# Patient Record
Sex: FEMALE | Race: WHITE | NOT HISPANIC OR LATINO | Employment: OTHER | URBAN - METROPOLITAN AREA
[De-identification: names, ages, dates, MRNs, and addresses within clinical notes are randomized per-mention and may not be internally consistent; named-entity substitution may affect disease eponyms.]

---

## 2023-11-24 ENCOUNTER — HOSPITAL ENCOUNTER (EMERGENCY)
Facility: HOSPITAL | Age: 43
Discharge: HOME | End: 2023-11-24
Attending: EMERGENCY MEDICINE
Payer: COMMERCIAL

## 2023-11-24 VITALS
WEIGHT: 144.2 LBS | RESPIRATION RATE: 18 BRPM | OXYGEN SATURATION: 98 % | TEMPERATURE: 98.2 F | SYSTOLIC BLOOD PRESSURE: 124 MMHG | DIASTOLIC BLOOD PRESSURE: 75 MMHG | HEART RATE: 80 BPM

## 2023-11-24 DIAGNOSIS — H04.123 BILATERAL DRY EYES: Primary | ICD-10-CM

## 2023-11-24 PROCEDURE — 99284 EMERGENCY DEPT VISIT MOD MDM: CPT | Performed by: EMERGENCY MEDICINE

## 2023-11-24 PROCEDURE — 99282 EMERGENCY DEPT VISIT SF MDM: CPT

## 2023-11-24 RX ORDER — ERYTHROMYCIN 5 MG/G
0.5 OINTMENT OPHTHALMIC ONCE
Status: COMPLETED | OUTPATIENT
Start: 2023-11-24 | End: 2023-11-24

## 2023-11-24 RX ORDER — MINERAL OIL, PETROLATUM 425; 573 MG/G; MG/G
OINTMENT OPHTHALMIC AS NEEDED
Qty: 3.5 G | Refills: 0 | Status: SHIPPED | OUTPATIENT
Start: 2023-11-24 | End: 2023-11-24 | Stop reason: SDUPTHER

## 2023-11-24 RX ORDER — ERYTHROMYCIN 5 MG/G
OINTMENT OPHTHALMIC
Qty: 50 G | Refills: 0 | Status: SHIPPED | OUTPATIENT
Start: 2023-11-24

## 2023-11-24 RX ORDER — TETRACAINE HYDROCHLORIDE 5 MG/ML
2 SOLUTION OPHTHALMIC ONCE
Status: COMPLETED | OUTPATIENT
Start: 2023-11-24 | End: 2023-11-24

## 2023-11-24 RX ORDER — KETOROLAC TROMETHAMINE 5 MG/ML
1 SOLUTION OPHTHALMIC 4 TIMES DAILY
Status: DISCONTINUED | OUTPATIENT
Start: 2023-11-24 | End: 2023-11-24 | Stop reason: HOSPADM

## 2023-11-24 RX ORDER — MINERAL OIL, PETROLATUM 425; 573 MG/G; MG/G
OINTMENT OPHTHALMIC AS NEEDED
Qty: 3.5 G | Refills: 0 | Status: SHIPPED | OUTPATIENT
Start: 2023-11-24

## 2023-11-24 RX ADMIN — ERYTHROMYCIN 0.5 INCH: 5 OINTMENT OPHTHALMIC at 06:40

## 2023-11-24 RX ADMIN — FLUORESCEIN SODIUM 1 STRIP: 1 STRIP OPHTHALMIC at 04:25

## 2023-11-24 RX ADMIN — KETOROLAC TROMETHAMINE 1 DROP: 5 SOLUTION OPHTHALMIC at 04:25

## 2023-11-24 RX ADMIN — TETRACAINE HYDROCHLORIDE 2 DROP: 5 SOLUTION OPHTHALMIC at 04:25

## 2023-11-24 NOTE — DISCHARGE INSTRUCTIONS
Ketorolac twice a day  Erythromycin every 4-6 hours while awake    Call the office of Dr. Tanner de leon at [facility] aid if you want them

## 2023-11-26 NOTE — ED PROVIDER NOTES
Final Diagnosis:  1. Bilateral dry eyes        Chief Complaint   Patient presents with    Eye Pain     Pt has bilateral eye pain and dryness. "I feel like there's an eyelash stuck in both eyes". Pt rep[orts pain and blurriness began at 8am yesterday morning after putting in contacts. Scleras red       HPI  Patient presents w/ dry eyes and some itchiness. She's been scratching her eyes a lot because of driving w/ contacts in. Has glasses she's using now and acn use throughout treatment. No pupillary abnoramlity. Consensual photophobia. No photophobia. No trauma. Has mild red conjunctivitis. On fluorescein no ulcer. Fields intact. Pressures normal. 14 and 12. Relief w/ tetracaine. EMS reports if applicable: N/A    - Previous charting underwent limited review with attention to last ED visits, labs, ekgs, and prior imaging. Chart review reveals :     No results found for any previous visit. - No language barrier.   - History obtained from patient . - There are no limitations to the history obtained. - Discuss patient's care, with patient permission or by chart review, with      PMH:   has a past medical history of Dry eyes. PSH:   has no past surgical history on file. Social History:  Tobacco Use: Not on file     Alcohol Use: Not on file     No illicit use       ROS:  Pertinent positives/negatives: Cristian Yee Some ROS may be present in the HPI and would take precedent over these standard questions asked below. Review of Systems   Eyes:  Positive for redness and itching. CONSTITUTIONAL:  No lethargy. No weakness. No unexpected weight loss. No appetite change. EYES:  No pain, redness, or discharge. No loss of vision. No orbital trauma or pain. ENT:  No tinnitus or decreased hearing. No epistaxis/purulent rhinorrhea. No voice change, airway closing, trismus. CARDIOVASCULAR:  No chest pain. No skin mottling or pallor. No change in exertional capacity  RESPIRATORY:  No hemoptysis.  No paroxysmal nocturnal dyspnea. No stridor. No audible wheezing. No production with cough. GASTROINTESTINAL:  Normal appetite. No vomiting, diarrhea. No pain. No bloating. No melena. No hematochezia. GENITOURINARY:  No frequency, urgency, nocturia. No hematuria or dysuria. No discharge. No sores/adenopathy. MUSCULOSKELETAL:  No arthralgias or myalgias that are new. No new deformity. INTEGUMENTARY:  No swelling. No unexpected contusions. No abrasions. No lymphangitis. NEUROLOGIC:  No meningismus. No new numbness of the extremities. No new focal weakness. No postural instability  PSYCHIATRIC:  No SI HI AVH  HEMATOLOGICAL:  No bleeding. No petechiae. No bruising. ALLERGIES:  No urticaria. No sudden abd cramping. No stridor. PE:     Physical exam highlights:   Physical Exam       Vitals:    11/24/23 0412   BP: 124/75   Pulse: 80   Resp: 18   Temp: 98.2 °F (36.8 °C)   TempSrc: Oral   SpO2: 98%   Weight: 65.4 kg (144 lb 3.2 oz)     Vitals reviewed by me. Nursing note reviewed  Chaperone present for all sensitive exam.  Const: No acute distress. Alert. Nontoxic. Not diaphoretic. HEENT: External ears normal. No protrusion drainage swelling. Nose normal. No drainage/traumatic deformity. MMM. Mouth with baseline/symmetric movement. No trismus. Eyes: No icterus. No tearing/swelling/drainage. Tracks through the room with normal EOM. Conjunctivitis. No ulcer on fluoresecin. Pressures 14 and 12. 20/70 b/l. Cannot appreciate flare. Pupils not fixed dilated or anything. Neck: ROM normal. No rigidity. No meningismus. Cards: Rate as per vitals Compared to monitor sinus unless documented. Regular Well perfused. Pulm: able to verbalize without additional effort. Effort and excursion normal. No distress. No audible wheezing/ stridor. Normal resp rate without retraction or change in work of breathing. Abd: No distension beyond baseline. No fluctuant wave. Patient without peritoneal pain with shifting/bumping the bed.    MSK: ROM normal baseline. No deformity. No contractures from baseline. Skin: No new rashes visible. Well perfused. No wounds visualized on exposed skin  Neuro: Nonfocal. Baseline. CN grossly intact. Moving all four with coordination. Psych: Normal behavior and affect. A:  - Nursing note reviewed. Ddx and MDM  Considered diagnoses  Sounds like conjunctivitis  Prefers erythromycin because ointment and dry eyes     Will leave off pseudomonas coverage, no contacts for a week    R/o ulcer  Abrasion? Superficial based on tetracaine test    Ketorolac, erythromycin, ophtho f/u          My conversation with consultant reveals: NA       Decision rules:                           My read of the XR/CT scan reveals:  NA   No orders to display       No orders of the defined types were placed in this encounter. Labs Reviewed - No data to display    *Each of these labs was reviewed. Particular standout labs will be noted in the ED Course above     Final Diagnosis:  1. Bilateral dry eyes          P:  - hospital tx includes   Medications   tetracaine 0.5 % ophthalmic solution 2 drop (2 drops Both Eyes Given 11/24/23 0425)   fluorescein sodium sterile ophthalmic strip 1 strip (1 strip Both Eyes Given 11/24/23 0425)   erythromycin (ILOTYCIN) 0.5 % ophthalmic ointment 0.5 inch (0.5 inches Both Eyes Given 11/24/23 0640)         - disposition  Time reflects when diagnosis was documented in both MDM as applicable and the Disposition within this note       Time User Action Codes Description Comment    11/24/2023  6:24 AM Heleneanitha Cool Add [O03.877] Bilateral dry eyes           ED Disposition       ED Disposition   Discharge    Condition   Stable    Date/Time   Fri Nov 24, 2023  6:24 AM    Comment   Milana Leo discharge to home/self care.                    Follow-up Information       Follow up With Specialties Details Why [address] Sunny Arceo MD Ophthalmology   [address] [facility] [address]  [phone]              - patient will call their PCP to let them know they were in the emergency department. We discuss return precautions and patient is agreeable with plan and aformentioned disposition. - additional treatment intended, if consistent with primary provider:  - patient to follow with :      Discharge Medication List as of 11/24/2023  6:26 AM        START taking these medications    Details   erythromycin (ILOTYCIN) ophthalmic ointment Place a 1/2 inch ribbon of ointment into the lower eyelid. , Normal           CONTINUE these medications which have CHANGED    Details   White Petrolatum-Mineral Oil (artificial tears) Administer to both eyes as needed for dry eyes, Starting Fri 11/24/2023, Normal           No discharge procedures on file. None       Portions of the record may have been created with voice recognition software. Occasional wrong word or "sound a like" substitutions may have occurred due to the inherent limitations of voice recognition software. Read the chart carefully and recognize, using context, where substitutions have occurred.     Electronically signed by:  MD Omid Caraballo MD  11/26/23 0691